# Patient Record
Sex: FEMALE | Race: ASIAN | NOT HISPANIC OR LATINO
[De-identification: names, ages, dates, MRNs, and addresses within clinical notes are randomized per-mention and may not be internally consistent; named-entity substitution may affect disease eponyms.]

---

## 2020-08-22 ENCOUNTER — RESULT REVIEW (OUTPATIENT)
Age: 30
End: 2020-08-22

## 2022-03-01 ENCOUNTER — NON-APPOINTMENT (OUTPATIENT)
Age: 32
End: 2022-03-01

## 2022-03-02 ENCOUNTER — APPOINTMENT (OUTPATIENT)
Dept: DERMATOLOGY | Facility: CLINIC | Age: 32
End: 2022-03-02
Payer: COMMERCIAL

## 2022-03-02 ENCOUNTER — LABORATORY RESULT (OUTPATIENT)
Age: 32
End: 2022-03-02

## 2022-03-02 VITALS — WEIGHT: 200 LBS | HEIGHT: 63 IN | BODY MASS INDEX: 35.44 KG/M2

## 2022-03-02 DIAGNOSIS — D48.5 NEOPLASM OF UNCERTAIN BEHAVIOR OF SKIN: ICD-10-CM

## 2022-03-02 DIAGNOSIS — L50.9 URTICARIA, UNSPECIFIED: ICD-10-CM

## 2022-03-02 DIAGNOSIS — L85.3 XEROSIS CUTIS: ICD-10-CM

## 2022-03-02 PROCEDURE — 11102 TANGNTL BX SKIN SINGLE LES: CPT | Mod: GC

## 2022-03-02 PROCEDURE — 99204 OFFICE O/P NEW MOD 45 MIN: CPT | Mod: GC,25

## 2022-03-10 ENCOUNTER — NON-APPOINTMENT (OUTPATIENT)
Age: 32
End: 2022-03-10

## 2022-08-01 ENCOUNTER — NON-APPOINTMENT (OUTPATIENT)
Age: 32
End: 2022-08-01

## 2022-08-01 ENCOUNTER — APPOINTMENT (OUTPATIENT)
Dept: OPHTHALMOLOGY | Facility: CLINIC | Age: 32
End: 2022-08-01

## 2022-08-01 PROCEDURE — 92015 DETERMINE REFRACTIVE STATE: CPT

## 2022-08-01 PROCEDURE — 92004 COMPRE OPH EXAM NEW PT 1/>: CPT

## 2022-08-05 ENCOUNTER — EMERGENCY (EMERGENCY)
Facility: HOSPITAL | Age: 32
LOS: 1 days | Discharge: HOME | End: 2022-08-05
Attending: EMERGENCY MEDICINE | Admitting: EMERGENCY MEDICINE

## 2022-08-05 VITALS
OXYGEN SATURATION: 99 % | SYSTOLIC BLOOD PRESSURE: 135 MMHG | HEART RATE: 77 BPM | RESPIRATION RATE: 16 BRPM | TEMPERATURE: 97 F | DIASTOLIC BLOOD PRESSURE: 83 MMHG

## 2022-08-05 LAB
ALBUMIN SERPL ELPH-MCNC: 4.6 G/DL — SIGNIFICANT CHANGE UP (ref 3.3–5)
ALP SERPL-CCNC: 80 U/L — SIGNIFICANT CHANGE UP (ref 40–120)
ALT FLD-CCNC: 94 U/L — HIGH (ref 4–33)
ANION GAP SERPL CALC-SCNC: 13 MMOL/L — SIGNIFICANT CHANGE UP (ref 7–14)
APPEARANCE UR: CLEAR — SIGNIFICANT CHANGE UP
AST SERPL-CCNC: 53 U/L — HIGH (ref 4–32)
BASOPHILS # BLD AUTO: 0.08 K/UL — SIGNIFICANT CHANGE UP (ref 0–0.2)
BASOPHILS NFR BLD AUTO: 0.7 % — SIGNIFICANT CHANGE UP (ref 0–2)
BILIRUB SERPL-MCNC: 0.4 MG/DL — SIGNIFICANT CHANGE UP (ref 0.2–1.2)
BILIRUB UR-MCNC: NEGATIVE — SIGNIFICANT CHANGE UP
BUN SERPL-MCNC: 10 MG/DL — SIGNIFICANT CHANGE UP (ref 7–23)
CALCIUM SERPL-MCNC: 9.4 MG/DL — SIGNIFICANT CHANGE UP (ref 8.4–10.5)
CHLORIDE SERPL-SCNC: 97 MMOL/L — LOW (ref 98–107)
CO2 SERPL-SCNC: 24 MMOL/L — SIGNIFICANT CHANGE UP (ref 22–31)
COLOR SPEC: SIGNIFICANT CHANGE UP
CREAT SERPL-MCNC: 0.7 MG/DL — SIGNIFICANT CHANGE UP (ref 0.5–1.3)
DIFF PNL FLD: NEGATIVE — SIGNIFICANT CHANGE UP
EGFR: 119 ML/MIN/1.73M2 — SIGNIFICANT CHANGE UP
EOSINOPHIL # BLD AUTO: 0.29 K/UL — SIGNIFICANT CHANGE UP (ref 0–0.5)
EOSINOPHIL NFR BLD AUTO: 2.7 % — SIGNIFICANT CHANGE UP (ref 0–6)
GLUCOSE SERPL-MCNC: 103 MG/DL — HIGH (ref 70–99)
GLUCOSE UR QL: NEGATIVE — SIGNIFICANT CHANGE UP
HCT VFR BLD CALC: 36.4 % — SIGNIFICANT CHANGE UP (ref 34.5–45)
HGB BLD-MCNC: 12.3 G/DL — SIGNIFICANT CHANGE UP (ref 11.5–15.5)
IANC: 7.52 K/UL — HIGH (ref 1.8–7.4)
IMM GRANULOCYTES NFR BLD AUTO: 0.5 % — SIGNIFICANT CHANGE UP (ref 0–1.5)
KETONES UR-MCNC: NEGATIVE — SIGNIFICANT CHANGE UP
LEUKOCYTE ESTERASE UR-ACNC: NEGATIVE — SIGNIFICANT CHANGE UP
LYMPHOCYTES # BLD AUTO: 2.25 K/UL — SIGNIFICANT CHANGE UP (ref 1–3.3)
LYMPHOCYTES # BLD AUTO: 20.7 % — SIGNIFICANT CHANGE UP (ref 13–44)
MCHC RBC-ENTMCNC: 29.9 PG — SIGNIFICANT CHANGE UP (ref 27–34)
MCHC RBC-ENTMCNC: 33.8 GM/DL — SIGNIFICANT CHANGE UP (ref 32–36)
MCV RBC AUTO: 88.6 FL — SIGNIFICANT CHANGE UP (ref 80–100)
MONOCYTES # BLD AUTO: 0.68 K/UL — SIGNIFICANT CHANGE UP (ref 0–0.9)
MONOCYTES NFR BLD AUTO: 6.3 % — SIGNIFICANT CHANGE UP (ref 2–14)
NEUTROPHILS # BLD AUTO: 7.52 K/UL — HIGH (ref 1.8–7.4)
NEUTROPHILS NFR BLD AUTO: 69.1 % — SIGNIFICANT CHANGE UP (ref 43–77)
NITRITE UR-MCNC: NEGATIVE — SIGNIFICANT CHANGE UP
NRBC # BLD: 0 /100 WBCS — SIGNIFICANT CHANGE UP
NRBC # FLD: 0 K/UL — SIGNIFICANT CHANGE UP
PH UR: 7 — SIGNIFICANT CHANGE UP (ref 5–8)
PLATELET # BLD AUTO: 405 K/UL — HIGH (ref 150–400)
POTASSIUM SERPL-MCNC: 4.1 MMOL/L — SIGNIFICANT CHANGE UP (ref 3.5–5.3)
POTASSIUM SERPL-SCNC: 4.1 MMOL/L — SIGNIFICANT CHANGE UP (ref 3.5–5.3)
PROT SERPL-MCNC: 7.4 G/DL — SIGNIFICANT CHANGE UP (ref 6–8.3)
PROT UR-MCNC: NEGATIVE — SIGNIFICANT CHANGE UP
RBC # BLD: 4.11 M/UL — SIGNIFICANT CHANGE UP (ref 3.8–5.2)
RBC # FLD: 12.9 % — SIGNIFICANT CHANGE UP (ref 10.3–14.5)
SODIUM SERPL-SCNC: 134 MMOL/L — LOW (ref 135–145)
SP GR SPEC: 1.02 — SIGNIFICANT CHANGE UP (ref 1–1.05)
UROBILINOGEN FLD QL: SIGNIFICANT CHANGE UP
WBC # BLD: 10.87 K/UL — HIGH (ref 3.8–10.5)
WBC # FLD AUTO: 10.87 K/UL — HIGH (ref 3.8–10.5)

## 2022-08-05 PROCEDURE — 76830 TRANSVAGINAL US NON-OB: CPT | Mod: 26

## 2022-08-05 PROCEDURE — 99285 EMERGENCY DEPT VISIT HI MDM: CPT

## 2022-08-05 NOTE — ED PROVIDER NOTE - NSICDXPASTMEDICALHX_GEN_ALL_CORE_FT
PAST MEDICAL HISTORY:  Miscarriage of left tubal ectopic pregnancy     PCOS (polycystic ovarian syndrome)      PAST MEDICAL HISTORY:  Miscarriage     PCOS (polycystic ovarian syndrome)

## 2022-08-05 NOTE — ED PROVIDER NOTE - PHYSICAL EXAMINATION
Gen: NAD. A&Ox4. Non-toxic appearing.  HEENT: Normocephalic and atraumatic. PERRL, EOMI, no nasal discharge, mucous membranes moist, no scleral icterus.  CV: Regular rate and rhythm, +S1/S2, no M/R/G. No significant lower extremity edema. Radial and DP pulses present and symmetrical. Capillary refill less than 2 seconds.  Resp: Normal effort and rate. CTAB, no rales, rhonchi, or wheezes.  GI: Abdomen soft, non-distended, non tender to palpation. Left pelvic pain to deep palpation. No masses appreciated.   : Chaperoned by Rekha Hinton ED Tech. Mucus in vaginal canal. No bleeding, OS closed  MSK: No open wounds and no bruising. No CVAT bilaterally.  Neuro: Following commands, speaking in full sentences, moving extremities spontaneously. CN II-XII intact. Strength and sensation symmetric and intact throughout. Gait normal.  Psych: Appropriate mood, cooperative

## 2022-08-05 NOTE — ED PROVIDER NOTE - ATTENDING CONTRIBUTION TO CARE
MD Pak:  patient seen and evaluated with the resident.  I was present for key portions of the History & Physical, and I agree with the Impression & Plan.  MD Pak:  33 yo F, c/o LLQ cramps   Onset: 9pm  Location:  LLQ and L flank  Associated Sx: denies CP/SOB/light-headedness/fatigue, no F/C, no N/V/D, +cramping pelvic pain.  Intensity: no vaginal bleeding  VS: wnl.  Physical Exam: adult F, NAD, NCAT, PERRL, EOMI, neck supple, RRR, CTA B, Abd: s/nd/+mild LLQ TTP  PELVIC EXAM:  nontender adnexa, no bleeding, no odor  Ext: no edema, Neuro: AAOx3, ambulates w/o diff, strength 5/5 & symmetric throughout.  Suspicion for other acute intraabdominal pathology that would necessitate w/o with CT is low at this time.   Impression:  ovarian cysts, >>> torsion  Plan:  cbc, bmp, INR, +/-TVUS.

## 2022-08-05 NOTE — ED ADULT NURSE NOTE - OBJECTIVE STATEMENT
pt A&ox4, came to ED for Left lower back pain that started yesterday. pt states pain started yesterday in her LLQ abdomen and is now in her left lower back.  pt denies Chest pain and SOB. pt denies H/A, Dizziness, lightheadedness, and radiating chest pain. breathing is spontaneous and unlabored. sating 99% on RA. bilateral pedal and radial pulses palpable and strong. right ac 20g IV placed Labs drawn and sent as per ordered. Bed in lowest position, call bell within reach, all other safety and comfort measures provided. awaiting labs results and further orders.

## 2022-08-05 NOTE — ED PROVIDER NOTE - PROGRESS NOTE DETAILS
Patient sitting comfortably. Currently does not endorse pain/ UA negative. Ultrasound shows 3.4cm left hemorrhagic cysts. Will follow up with OB/GYN and dispo with return precautions. Patient sitting comfortably. Currently does not endorse pain. UA negative. Ultrasound shows enlarged left ovary with 3.4cm left hemorrhagic cysts. Will follow up with OB/GYN and dispo with return precautions. Patient sitting comfortably. Endorses mild pain. UA negative. Ultrasound shows enlarged left ovary with 3.4cm left hemorrhagic cysts. Will follow up with OB/GYN and dispo with return precautions.

## 2022-08-05 NOTE — ED PROVIDER NOTE - OBJECTIVE STATEMENT
33 y/o F PMHx PCOS, prior ectopic, presents with one day of Left pelvic pain and back pain. Patient had sudden sharp constant left pelvic pain yesterday, fell asleep and woke up with now left back pain as well. Pain is now intermittent. Has not taken anything for pain. No N/V/D, dysuria, hematuria, numbness/tingling, lightheadedness. LMP 6/11/22. 33 y/o F PMHx PCOS, prior miscarriage, presents with one day of Left pelvic pain and back pain. Patient had sudden sharp constant left pelvic pain yesterday, fell asleep and woke up with now left back pain as well. Pain is now intermittent. Has not taken anything for pain. No N/V/D, dysuria, hematuria, numbness/tingling, lightheadedness. LMP 6/11/22.

## 2022-08-05 NOTE — ED PROVIDER NOTE - PATIENT PORTAL LINK FT
You can access the FollowMyHealth Patient Portal offered by [facility] by registering at the following website: [email or web address]. By joining [employer]’s FollowMyHealth portal, you will also be able to view your health information using other applications (apps) compatible with our system.

## 2022-08-05 NOTE — ED PROVIDER NOTE - NSFOLLOWUPCLINICS_GEN_ALL_ED_FT
[facility]  OB/GYN & Surg  [address] [place] [address]  Phone: [phone]  Fax:

## 2022-08-05 NOTE — ED ADULT TRIAGE NOTE - CHIEF COMPLAINT QUOTE
c/o left lower back pain radiating into left lower quad abdomen since yesterday. Denies any vaginal bleeding or abnormal discharge. LMP 6/11/22, irregular periods. Home pregnancy test was negative. Hx ectopic pregnancy 2017

## 2022-08-05 NOTE — ED PROVIDER NOTE - NS ED ROS FT
GENERAL: No fever or chills  EYES: No change in vision  HEENT: No trouble swallowing or speaking  CARDIAC: No chest pain  PULMONARY: No cough or SOB  GI: No other abdominal pain, no nausea or no vomiting, no diarrhea or constipation  : No changes in urination  SKIN: No rashes  NEURO: No headache, no numbness  MSK: No joint pain  Otherwise as HPI or negative.

## 2022-08-05 NOTE — ED PROVIDER NOTE - CLINICAL SUMMARY MEDICAL DECISION MAKING FREE TEXT BOX
33 y/o F PMHx of PCOS and prior ectopic presents with one day of left pelvic and left lower back pain. Patient sitting comfortably and vital WNL. DDx includes ovarian torsion vs ectopic vs kidney stones. Low suspicion for tubo-ovarian abscess or PID. Will order pregnancy test, labs, transvaginal US, reassess. 33 y/o F PMHx of PCOS and prior miscarriage presents with one day of left pelvic and left lower back pain. Patient sitting comfortably and vital WNL. DDx includes ovarian torsion vs ectopic vs kidney stones. Low suspicion for tubo-ovarian abscess or PID. Will order pregnancy test, labs, transvaginal US, reassess.

## 2022-08-06 ENCOUNTER — TRANSCRIPTION ENCOUNTER (OUTPATIENT)
Age: 32
End: 2022-08-06

## 2022-08-06 ENCOUNTER — INPATIENT (INPATIENT)
Facility: HOSPITAL | Age: 32
LOS: 0 days | Discharge: HOME | End: 2022-08-07
Attending: STUDENT IN AN ORGANIZED HEALTH CARE EDUCATION/TRAINING PROGRAM | Admitting: STUDENT IN AN ORGANIZED HEALTH CARE EDUCATION/TRAINING PROGRAM

## 2022-08-06 VITALS
OXYGEN SATURATION: 98 % | SYSTOLIC BLOOD PRESSURE: 131 MMHG | RESPIRATION RATE: 16 BRPM | TEMPERATURE: 98 F | DIASTOLIC BLOOD PRESSURE: 84 MMHG | HEART RATE: 77 BPM

## 2022-08-06 LAB
APTT BLD: 30.6 SEC — SIGNIFICANT CHANGE UP (ref 27–36.3)
BASOPHILS # BLD AUTO: 0.08 K/UL — SIGNIFICANT CHANGE UP (ref 0–0.2)
BASOPHILS NFR BLD AUTO: 0.6 % — SIGNIFICANT CHANGE UP (ref 0–2)
EOSINOPHIL # BLD AUTO: 0.14 K/UL — SIGNIFICANT CHANGE UP (ref 0–0.5)
EOSINOPHIL NFR BLD AUTO: 1 % — SIGNIFICANT CHANGE UP (ref 0–6)
HCT VFR BLD CALC: 38.4 % — SIGNIFICANT CHANGE UP (ref 34.5–45)
HGB BLD-MCNC: 12.8 G/DL — SIGNIFICANT CHANGE UP (ref 11.5–15.5)
IANC: 12.15 K/UL — HIGH (ref 1.8–7.4)
IMM GRANULOCYTES NFR BLD AUTO: 0.6 % — SIGNIFICANT CHANGE UP (ref 0–1.5)
INR BLD: 0.99 RATIO — SIGNIFICANT CHANGE UP (ref 0.88–1.16)
LYMPHOCYTES # BLD AUTO: 1.36 K/UL — SIGNIFICANT CHANGE UP (ref 1–3.3)
LYMPHOCYTES # BLD AUTO: 9.5 % — LOW (ref 13–44)
MCHC RBC-ENTMCNC: 29.8 PG — SIGNIFICANT CHANGE UP (ref 27–34)
MCHC RBC-ENTMCNC: 33.3 GM/DL — SIGNIFICANT CHANGE UP (ref 32–36)
MCV RBC AUTO: 89.3 FL — SIGNIFICANT CHANGE UP (ref 80–100)
MONOCYTES # BLD AUTO: 0.51 K/UL — SIGNIFICANT CHANGE UP (ref 0–0.9)
MONOCYTES NFR BLD AUTO: 3.6 % — SIGNIFICANT CHANGE UP (ref 2–14)
NEUTROPHILS # BLD AUTO: 12.15 K/UL — HIGH (ref 1.8–7.4)
NEUTROPHILS NFR BLD AUTO: 84.7 % — HIGH (ref 43–77)
NRBC # BLD: 0 /100 WBCS — SIGNIFICANT CHANGE UP
NRBC # FLD: 0 K/UL — SIGNIFICANT CHANGE UP
PLATELET # BLD AUTO: 403 K/UL — HIGH (ref 150–400)
PROTHROM AB SERPL-ACNC: 11.5 SEC — SIGNIFICANT CHANGE UP (ref 10.5–13.4)
RBC # BLD: 4.3 M/UL — SIGNIFICANT CHANGE UP (ref 3.8–5.2)
RBC # FLD: 12.9 % — SIGNIFICANT CHANGE UP (ref 10.3–14.5)
WBC # BLD: 14.32 K/UL — HIGH (ref 3.8–10.5)
WBC # FLD AUTO: 14.32 K/UL — HIGH (ref 3.8–10.5)

## 2022-08-06 PROCEDURE — 99284 EMERGENCY DEPT VISIT MOD MDM: CPT

## 2022-08-06 PROCEDURE — 76830 TRANSVAGINAL US NON-OB: CPT | Mod: 26

## 2022-08-06 RX ORDER — MORPHINE SULFATE 50 MG/1
4 CAPSULE, EXTENDED RELEASE ORAL ONCE
Refills: 0 | Status: DISCONTINUED | OUTPATIENT
Start: 2022-08-06 | End: 2022-08-06

## 2022-08-06 RX ORDER — ONDANSETRON 8 MG/1
4 TABLET, FILM COATED ORAL ONCE
Refills: 0 | Status: COMPLETED | OUTPATIENT
Start: 2022-08-06 | End: 2022-08-06

## 2022-08-06 RX ADMIN — MORPHINE SULFATE 4 MILLIGRAM(S): 50 CAPSULE, EXTENDED RELEASE ORAL at 23:05

## 2022-08-06 RX ADMIN — ONDANSETRON 4 MILLIGRAM(S): 8 TABLET, FILM COATED ORAL at 23:06

## 2022-08-06 NOTE — ED PROVIDER NOTE - PHYSICAL EXAMINATION
VITALS: reviewed  GEN: NAD, A & O x 4  HEAD/EYES: NCAT, EOMI, anicteric sclerae  ENT: mucus membranes moist, oropharynx WNL, trachea midline  RESP: lungs CTA with equal breath sounds bilaterally, chest wall nontender and atraumatic  CV: heart with reg rhythm S1, S2, distal pulses intact and symmetric bilaterally  ABDOMEN: soft, nondistended, nontender, no palpable masses  : no CVAT  MSK: extremities atraumatic and nontender, no edema, no asymmetry.   SKIN: warm, dry, no rash, no bruising, no cyanosis. color appropriate for ethnicity  NEURO: alert, mentating appropriately, no facial asymmetry.   PSYCH: Affect appropriate

## 2022-08-06 NOTE — ED PROVIDER NOTE - PROGRESS NOTE DETAILS
Lizy FARRIS: Received sign out from my colleague Dr. Cohen pt w return visit for pelvic pain w c/f possible torsion, US w c/f larger cyst, on reassessment pt still w pain, will give more meds, offered dx lap per OB, pt agrees, will admit.

## 2022-08-06 NOTE — ED PROVIDER NOTE - OBJECTIVE STATEMENT
31 yo F hx PCOS, prior miscarriage, presenting with worsening L sided pelvic pain now with associated nausea/vomiting. Pain was initially intermittent, now constant. Seen in ED one day ago with noted L sided hemorraghic cyst. Denies vaginal bleeding, urinary sx. No fevers/chills. Took Aleve for pain this morning without relief.      On OCP.

## 2022-08-06 NOTE — ED ADULT TRIAGE NOTE - CHIEF COMPLAINT QUOTE
seen here yesterday-- dx with L ovarian cyst, pain worsening today with n/v, LMP 6/11 (abn periods are normal 2/2 birth control)

## 2022-08-06 NOTE — ED PROVIDER NOTE - CLINICAL SUMMARY MEDICAL DECISION MAKING FREE TEXT BOX
31 yo F presenting with  nv and worsening pelvic pain in setting of ovarian cyst, plan for repeat US r/o torsion, meds, labs, reassess

## 2022-08-06 NOTE — ED ADULT NURSE NOTE - OBJECTIVE STATEMENT
Patient received with the complaints of left lower quadrant pain . As per patient she was seen here yesterday and was discharged with ovarian cyst but pain is getting worse with nausea/vomiting. No other complaints. Specimens collected and sent. Medications given as ordered. Patient tolerated well. No distress noted. Nursing care continues

## 2022-08-07 ENCOUNTER — TRANSCRIPTION ENCOUNTER (OUTPATIENT)
Age: 32
End: 2022-08-07

## 2022-08-07 ENCOUNTER — RESULT REVIEW (OUTPATIENT)
Age: 32
End: 2022-08-07

## 2022-08-07 VITALS
HEART RATE: 74 BPM | RESPIRATION RATE: 20 BRPM | DIASTOLIC BLOOD PRESSURE: 62 MMHG | OXYGEN SATURATION: 97 % | SYSTOLIC BLOOD PRESSURE: 112 MMHG

## 2022-08-07 DIAGNOSIS — N83.209 UNSPECIFIED OVARIAN CYST, UNSPECIFIED SIDE: ICD-10-CM

## 2022-08-07 PROBLEM — O03.9 COMPLETE OR UNSPECIFIED SPONTANEOUS ABORTION WITHOUT COMPLICATION: Chronic | Status: ACTIVE | Noted: 2022-08-05

## 2022-08-07 PROBLEM — E28.2 POLYCYSTIC OVARIAN SYNDROME: Chronic | Status: ACTIVE | Noted: 2022-08-05

## 2022-08-07 LAB
ALBUMIN SERPL ELPH-MCNC: 4.7 G/DL — SIGNIFICANT CHANGE UP (ref 3.3–5)
ALP SERPL-CCNC: 75 U/L — SIGNIFICANT CHANGE UP (ref 40–120)
ALT FLD-CCNC: 108 U/L — HIGH (ref 4–33)
ANION GAP SERPL CALC-SCNC: 14 MMOL/L — SIGNIFICANT CHANGE UP (ref 7–14)
AST SERPL-CCNC: 111 U/L — HIGH (ref 4–32)
B PERT DNA SPEC QL NAA+PROBE: SIGNIFICANT CHANGE UP
B PERT+PARAPERT DNA PNL SPEC NAA+PROBE: SIGNIFICANT CHANGE UP
BILIRUB SERPL-MCNC: 0.4 MG/DL — SIGNIFICANT CHANGE UP (ref 0.2–1.2)
BLD GP AB SCN SERPL QL: NEGATIVE — SIGNIFICANT CHANGE UP
BORDETELLA PARAPERTUSSIS (RAPRVP): SIGNIFICANT CHANGE UP
BUN SERPL-MCNC: 10 MG/DL — SIGNIFICANT CHANGE UP (ref 7–23)
C PNEUM DNA SPEC QL NAA+PROBE: SIGNIFICANT CHANGE UP
CALCIUM SERPL-MCNC: 9.1 MG/DL — SIGNIFICANT CHANGE UP (ref 8.4–10.5)
CHLORIDE SERPL-SCNC: 99 MMOL/L — SIGNIFICANT CHANGE UP (ref 98–107)
CO2 SERPL-SCNC: 20 MMOL/L — LOW (ref 22–31)
CREAT SERPL-MCNC: 0.38 MG/DL — LOW (ref 0.5–1.3)
EGFR: 138 ML/MIN/1.73M2 — SIGNIFICANT CHANGE UP
FLUAV SUBTYP SPEC NAA+PROBE: SIGNIFICANT CHANGE UP
FLUBV RNA SPEC QL NAA+PROBE: SIGNIFICANT CHANGE UP
GLUCOSE SERPL-MCNC: 127 MG/DL — HIGH (ref 70–99)
HADV DNA SPEC QL NAA+PROBE: SIGNIFICANT CHANGE UP
HCOV 229E RNA SPEC QL NAA+PROBE: SIGNIFICANT CHANGE UP
HCOV HKU1 RNA SPEC QL NAA+PROBE: SIGNIFICANT CHANGE UP
HCOV NL63 RNA SPEC QL NAA+PROBE: SIGNIFICANT CHANGE UP
HCOV OC43 RNA SPEC QL NAA+PROBE: SIGNIFICANT CHANGE UP
HMPV RNA SPEC QL NAA+PROBE: SIGNIFICANT CHANGE UP
HPIV1 RNA SPEC QL NAA+PROBE: SIGNIFICANT CHANGE UP
HPIV2 RNA SPEC QL NAA+PROBE: SIGNIFICANT CHANGE UP
HPIV3 RNA SPEC QL NAA+PROBE: SIGNIFICANT CHANGE UP
HPIV4 RNA SPEC QL NAA+PROBE: SIGNIFICANT CHANGE UP
M PNEUMO DNA SPEC QL NAA+PROBE: SIGNIFICANT CHANGE UP
POTASSIUM SERPL-MCNC: SIGNIFICANT CHANGE UP MMOL/L (ref 3.5–5.3)
POTASSIUM SERPL-SCNC: SIGNIFICANT CHANGE UP MMOL/L (ref 3.5–5.3)
PROT SERPL-MCNC: 8.3 G/DL — SIGNIFICANT CHANGE UP (ref 6–8.3)
RAPID RVP RESULT: SIGNIFICANT CHANGE UP
RH IG SCN BLD-IMP: POSITIVE — SIGNIFICANT CHANGE UP
RSV RNA SPEC QL NAA+PROBE: SIGNIFICANT CHANGE UP
RV+EV RNA SPEC QL NAA+PROBE: SIGNIFICANT CHANGE UP
SARS-COV-2 RNA SPEC QL NAA+PROBE: SIGNIFICANT CHANGE UP
SODIUM SERPL-SCNC: 133 MMOL/L — LOW (ref 135–145)

## 2022-08-07 PROCEDURE — 88305 TISSUE EXAM BY PATHOLOGIST: CPT | Mod: 26

## 2022-08-07 PROCEDURE — 58925 REMOVAL OF OVARIAN CYST(S): CPT

## 2022-08-07 DEVICE — VISTASEAL FIBRIN HUMAN 10ML: Type: IMPLANTABLE DEVICE | Status: FUNCTIONAL

## 2022-08-07 RX ORDER — ONDANSETRON 8 MG/1
4 TABLET, FILM COATED ORAL ONCE
Refills: 0 | Status: DISCONTINUED | OUTPATIENT
Start: 2022-08-07 | End: 2022-08-07

## 2022-08-07 RX ORDER — KETOROLAC TROMETHAMINE 30 MG/ML
15 SYRINGE (ML) INJECTION ONCE
Refills: 0 | Status: DISCONTINUED | OUTPATIENT
Start: 2022-08-07 | End: 2022-08-07

## 2022-08-07 RX ORDER — HYDROMORPHONE HYDROCHLORIDE 2 MG/ML
0.5 INJECTION INTRAMUSCULAR; INTRAVENOUS; SUBCUTANEOUS ONCE
Refills: 0 | Status: DISCONTINUED | OUTPATIENT
Start: 2022-08-07 | End: 2022-08-07

## 2022-08-07 RX ORDER — OXYCODONE HYDROCHLORIDE 5 MG/1
1 TABLET ORAL
Qty: 5 | Refills: 0
Start: 2022-08-07

## 2022-08-07 RX ORDER — ACETAMINOPHEN 500 MG
975 TABLET ORAL ONCE
Refills: 0 | Status: COMPLETED | OUTPATIENT
Start: 2022-08-07 | End: 2022-08-07

## 2022-08-07 RX ORDER — OXYCODONE HYDROCHLORIDE 5 MG/1
5 TABLET ORAL ONCE
Refills: 0 | Status: DISCONTINUED | OUTPATIENT
Start: 2022-08-07 | End: 2022-08-07

## 2022-08-07 RX ORDER — SODIUM CHLORIDE 9 MG/ML
1000 INJECTION, SOLUTION INTRAVENOUS
Refills: 0 | Status: DISCONTINUED | OUTPATIENT
Start: 2022-08-07 | End: 2022-08-07

## 2022-08-07 RX ADMIN — SODIUM CHLORIDE 125 MILLILITER(S): 9 INJECTION, SOLUTION INTRAVENOUS at 04:57

## 2022-08-07 RX ADMIN — Medication 15 MILLIGRAM(S): at 03:25

## 2022-08-07 RX ADMIN — Medication 975 MILLIGRAM(S): at 02:45

## 2022-08-07 NOTE — H&P ADULT - ATTENDING COMMENTS
Shortly before surgery, I met with this patient and discussed the planned procedure, including exam under anesthesia by myself and learners (resident/medical student).  Risks of the procedure were reviewed, including but not limited to bleeding, infection, damage to surrounding organs, possibility for unplanned procedure if complications arise.  All questions were answered.    SRIDHAR Torres MD

## 2022-08-07 NOTE — H&P ADULT - ASSESSMENT
33yo  LMP  presents with LLQ pain i/s/o L adnexal ovarian cyst >3.5cm seen on TVUS  now 4.2cm in width and more complex in appearance. While VSS, patient is requiring multiple doses of morphine concerning for right sided torsion:    - Admit to GYN service  - Prepare for OR - send Covid, T&S x2.  - Patient Consented and Added on for OR emergently.  - Discussed the possibility that diagnostic laparoscopy may reveal no torsion or simple untwisting of ovary(ies), or that the laparoscopy may become operative requiring excision of cyst, fallopian tube, ovaries or all three structures - on left or right given laterally on imaging isn't absolute. Patient endorses understanding the various operative possible.    Amyeo Afroz Jereen, PGY-2  Discusses and assessed patient w/ Dr [place] 33yo  LMP  presents with LLQ pain i/s/o L adnexal ovarian cyst >3.5cm seen on TVUS  now 4.2cm in width and more complex in appearance. While VSS, patient is requiring multiple doses of morphine concerning for leftsided torsion:    - Admit to GYN service  - Prepare for OR - send Covid, T&S x2.  - Patient Consented and Added on for OR emergently.  - Discussed the possibility that diagnostic laparoscopy may reveal no torsion or simple untwisting of ovary(ies), or that the laparoscopy may become operative requiring excision of cyst, fallopian tube, ovaries or all three structures - on left or right given laterally on imaging isn't absolute. Patient endorses understanding the various operative possible.    Amyeo Afroz Jereen, PGY-2  Discusses and assessed patient w/ Dr [place] 33yo  LMP  presents with LLQ pain i/s/o L adnexal ovarian cyst >3.5cm seen on TVUS  now 4.2cm in width and more complex in appearance. While VSS, patient is requiring multiple doses of morphine concerning for leftsided torsion:    - Admit to GYN service  - Prepare for OR - send Covid, T&S x2.  - Patient Consented and Added on for OR emergently.  - Disc diagnostic laparoscopy, possible de torsion of ovary, possible ovarian cystectomy, possible ex lap. Risks and benefits disc including bleeding, infection, injury to surrounding organs.   - Discussed the possibility that diagnostic laparoscopy may reveal no torsion or simple untwisting of ovary(ies), or that the laparoscopy may become operative requiring excision of cyst, fallopian tube, ovaries or all three structures - on left or right given laterally on imaging isn't absolute. Patient endorses understanding the various operative possible.    Amyeo Afroz Jereen, PGY-2  Discusses and assessed patient w/ Dr [place]        Agree with above   Basilio FARRIS

## 2022-08-07 NOTE — H&P ADULT - NSHPPHYSICALEXAM_GEN_ALL_CORE
Gen: NAD  Cards: RRR  Pulm: CTAB  Abd: soft, LLQ tenderness, no rebound, guarding  Ext: warm, well perfused Gen: NAD  Cards: RRR  Pulm: CTAB  Abd: soft, + LLQ tenderness, no rebound, guarding  Ext: warm, well perfused

## 2022-08-07 NOTE — PACU DISCHARGE NOTE - THE ANESTHESIA ORDERS USED IN THE PACU ORDER SET WILL BE DISCONTINUED UPON TRANSFER OF THIS PATIENT
----- Message from Koki Townsend sent at 4/14/2020  7:41 AM PDT -----  Regarding: RE: Prescription Question  Contact: [phone]  Thank you, I made an appointment for this Friday, do I need a complete panel for all my meds and not just a TSH test? I'm just wondering as I thought I always had a complete panel.....    Thank you,  Koki PEREA   Statement Selected

## 2022-08-07 NOTE — H&P ADULT - HISTORY OF PRESENT ILLNESS
31yo  LMP  presents for the second time to the ED for management of LLQ pain, for which she presented on Friday and was discharge w/ diagnosis of hemorrhagic cyst. TVUS on  showed a 3.2 L sided hemorrhagic cyst that has now grown to 4.2cm and more complex in appearance on today's TVUS. Patient states her pain had improve upon discharge on , however yesterday afternoon, patient's pain started worsening requiring a 2nd visit to ED. Patient was given morphine 9p w/ improvement of pain to 7/10, currently continued to have pain on movement, and has required Zofran for nausea control 2/2 to her pain. Patient denies chest pain, shortness of breath, fevers, chills, nausea, vomiting, diarrhea.     OBHx: SAB in 2017  GYNHx: denies fibroids, cysts, abnormal paps, STDs  PMH: denies  PSH: denies  Meds: PNVs  All: NKDA  Soc: no substance use, anxiety/depression    accepts blood

## 2022-08-07 NOTE — BRIEF OPERATIVE NOTE - NSICDXBRIEFPROCEDURE_GEN_ALL_CORE_FT
PROCEDURES:  Diagnostic laparoscopy 07-Aug-2022 11:04:42  Klarissa Thompson  Laparoscopic left ovarian cystectomy 07-Aug-2022 11:04:54  Klarissa Thompson  Lysis of adhesions, pelvic 07-Aug-2022 11:05:05  Klarissa Thompson

## 2022-08-07 NOTE — BRIEF OPERATIVE NOTE - OPERATION/FINDINGS
Approximately 4cm right ovarian cyst densely adherent to the right pelvic sidewall, rectum and posterior uterine surface as well as the left fallopian tube. The left fallopian tube was noted to be dilated in appearance with endometriotic appearing implants. The right ovary and fallopian tube were adherent to the right pelvic sidewall and posterior uterine surface. The right fallopian tube was noted to be smaller in caliber than the left, however also dilated in appearance. The right ovary appeared otherwise normal. The uterus was approximately 7cn with smooth serosa, however adherent as mentioned to the bilateral ovaries, fallopian tubes and posteriorly to the rectum. The posterior cul-de-sac was noted to be obliterated by endometriotic adhesions.

## 2022-08-07 NOTE — ASU DISCHARGE PLAN (ADULT/PEDIATRIC) - NS MD DC FALL RISK RISK
For information on Fall & Injury Prevention, visit: [email or web address] OR  [email or web address] OR  https://www.cdc.gov/steadi/patient.html

## 2022-08-07 NOTE — ASU DISCHARGE PLAN (ADULT/PEDIATRIC) - PROVIDER TOKENS
FREE:[LAST:[[facility]],PHONE:[phone]],FAX:[(   )    -],ADDRESS:[[facility]  Oncology [facility], Basement Level  [address] [place] [address]],FOLLOWUP:[2 weeks]]

## 2022-08-07 NOTE — ASU DISCHARGE PLAN (ADULT/PEDIATRIC) - CARE PROVIDER_API CALL
[facility],   [facility]  Oncology Building, Basement Level  [address], [place] [address]  Phone: [phone]  Fax: (   )    -  Follow Up Time: 2 weeks

## 2022-08-08 NOTE — CHART NOTE - NSCHARTNOTEFT_GEN_A_CORE
Evaluated at bedside for post-op check.     Patient feels well. Pain well controlled. Ambulating from stretcher to bathroom without difficulty. Denies fevers, chills, CP, SOB, N/V.     PE  Vital Signs Last 24 Hrs  T(C): 37.2 (07 Aug 2022 09:10), Max: 37.2 (07 Aug 2022 09:10)  T(F): 99 (07 Aug 2022 09:10), Max: 99 (07 Aug 2022 09:10)  HR: 74 (07 Aug 2022 10:15) (66 - 93)  BP: 108/55 (07 Aug 2022 10:15) (96/78 - 131/84)  BP(mean): 68 (07 Aug 2022 10:15) (51 - 82)  RR: 16 (07 Aug 2022 10:15) (14 - 19)  SpO2: 97% (07 Aug 2022 10:15) (95% - 100%)    General: AOx3, NAD   Cardiac: RRR   Pulm: CTAB   Abdomen: Soft, non-tender. normoactive bowel sounds   Incisions: LSC port sites c/d/i. Steri-strips in place   Extremities: Non-tender, nonedematous      33 yo  s/p LSC L. cystectomy + lysis of adhesion. Doing well, meeting recovery milestones.   - discharge home with 2 week follow-up   - pain prescription sent     Anita Hale MD, PGY-2
R4 Chart Note     Patient contacted via phone.   Reports she feels well postoperatively.   She is ambulating without difficulty.   Tolerating regular diet without nausea or emesis.   Has passed flatus and a bowel movement.   Pain is well controlled.      - To follow-up in 2wks for post op visit    - Return precautions reviewed     Soloff PGY4
The patients operative note was dictated with dictation #: [id number]
GYN Service Attending    Called in for this patient for OR for r/o torsion.  Pt consented for laparoscopy, possible detorsion of ovary, ovarian cystectomy, possible salpingoophorectomy and taken to OR.  Diagnostic laparoscopy revealed multiple adhesions c/w endometriosis.  Bowel adherent to the round ligament and left adnexa.  Left ovary adherent to the pelvic sidewall and posterior cul-de-sac.  Bowel adhesions to the left round ligament and left adnexa were taken down.  The left pelvic side wall was opened and the ureter identified.  At this point in the case, Dr. Muñoz took over care of the patient.    SRIDHAR Torres MD

## 2022-08-11 ENCOUNTER — APPOINTMENT (OUTPATIENT)
Dept: OTOLARYNGOLOGY | Facility: CLINIC | Age: 32
End: 2022-08-11

## 2022-08-11 VITALS
DIASTOLIC BLOOD PRESSURE: 84 MMHG | HEART RATE: 82 BPM | WEIGHT: 200 LBS | HEIGHT: 63 IN | SYSTOLIC BLOOD PRESSURE: 121 MMHG | BODY MASS INDEX: 35.44 KG/M2

## 2022-08-11 DIAGNOSIS — Z83.3 FAMILY HISTORY OF DIABETES MELLITUS: ICD-10-CM

## 2022-08-11 DIAGNOSIS — Z87.891 PERSONAL HISTORY OF NICOTINE DEPENDENCE: ICD-10-CM

## 2022-08-11 DIAGNOSIS — Z78.9 OTHER SPECIFIED HEALTH STATUS: ICD-10-CM

## 2022-08-11 LAB — SURGICAL PATHOLOGY STUDY: SIGNIFICANT CHANGE UP

## 2022-08-11 PROCEDURE — 92567 TYMPANOMETRY: CPT

## 2022-08-11 PROCEDURE — 99203 OFFICE O/P NEW LOW 30 MIN: CPT

## 2022-08-11 PROCEDURE — 92557 COMPREHENSIVE HEARING TEST: CPT

## 2022-08-11 RX ORDER — OXYCODONE 5 MG/1
5 TABLET ORAL
Qty: 5 | Refills: 0 | Status: DISCONTINUED | COMMUNITY
Start: 2022-08-07

## 2022-08-11 RX ORDER — KETOCONAZOLE 20.5 MG/ML
2 SHAMPOO, SUSPENSION TOPICAL
Qty: 1 | Refills: 11 | Status: DISCONTINUED | COMMUNITY
Start: 2022-03-02 | End: 2022-08-11

## 2022-08-11 RX ORDER — NORETHINDRONE ACETATE AND ETHINYL ESTRADIOL, AND FERROUS FUMARATE 1MG-20(24)
KIT ORAL
Refills: 0 | Status: ACTIVE | COMMUNITY

## 2022-08-11 RX ORDER — IBUPROFEN 200 MG/1
200 TABLET ORAL
Refills: 0 | Status: ACTIVE | COMMUNITY

## 2022-08-23 NOTE — DATA REVIEWED
Sure that is fine.     Thank you  Jayne Bone DO      [de-identified] : Hearing -1kHz sloping to a mild to moderate SNHL AU\par Type A tymps AU

## 2022-08-23 NOTE — HISTORY OF PRESENT ILLNESS
[de-identified] : 31 yo F with history of hearing loss as teenager - wore hearing aids - stopped wearing at 17 yo. Had intermittent change in hearing AS - lasting a couple of seconds - occurred once a couple of weeks ago. Hearing now back to baseline. Intermittently has high pitched tinnitus bilaterally. No tinnitus, otalgia, otorrhea, ear infections, dizziness or headaches. Slept with headphones with loud music as a child. No history of head trauma and exposure to loud noises. Has history of dry eyes and seasonal allergies. No joint pain or  sister has hx of HL - wore hearing aids - no oME

## 2022-08-29 ENCOUNTER — RESULT REVIEW (OUTPATIENT)
Age: 32
End: 2022-08-29

## 2022-08-29 ENCOUNTER — OUTPATIENT (OUTPATIENT)
Dept: OUTPATIENT SERVICES | Facility: HOSPITAL | Age: 32
LOS: 1 days | End: 2022-08-29

## 2022-08-29 ENCOUNTER — APPOINTMENT (OUTPATIENT)
Dept: OBGYN | Facility: HOSPITAL | Age: 32
End: 2022-08-29

## 2022-08-29 VITALS
WEIGHT: 210 LBS | HEART RATE: 71 BPM | HEIGHT: 60 IN | SYSTOLIC BLOOD PRESSURE: 134 MMHG | DIASTOLIC BLOOD PRESSURE: 68 MMHG | BODY MASS INDEX: 41.23 KG/M2 | TEMPERATURE: 98.1 F

## 2022-08-29 DIAGNOSIS — N89.8 OTHER SPECIFIED NONINFLAMMATORY DISORDERS OF VAGINA: ICD-10-CM

## 2022-08-29 DIAGNOSIS — Z00.00 ENCOUNTER FOR GENERAL ADULT MEDICAL EXAMINATION W/OUT ABNORMAL FINDINGS: ICD-10-CM

## 2022-08-29 DIAGNOSIS — Z09 ENCOUNTER FOR FOLLOW-UP EXAMINATION AFTER COMPLETED TREATMENT FOR CONDITIONS OTHER THAN MALIGNANT NEOPLASM: ICD-10-CM

## 2022-08-29 PROCEDURE — 99024 POSTOP FOLLOW-UP VISIT: CPT

## 2022-08-29 RX ORDER — DESOGESTREL AND ETHINYL ESTRADIOL 0.15-0.03
0.15-3 KIT ORAL
Qty: 3 | Refills: 3 | Status: ACTIVE | COMMUNITY
Start: 2022-08-29 | End: 1900-01-01

## 2022-08-30 LAB
C TRACH RRNA SPEC QL NAA+PROBE: SIGNIFICANT CHANGE UP
CANDIDA AB TITR SER: SIGNIFICANT CHANGE UP
G VAGINALIS DNA SPEC QL NAA+PROBE: SIGNIFICANT CHANGE UP
N GONORRHOEA RRNA SPEC QL NAA+PROBE: SIGNIFICANT CHANGE UP
SPECIMEN SOURCE: SIGNIFICANT CHANGE UP
T VAGINALIS SPEC QL WET PREP: SIGNIFICANT CHANGE UP

## 2022-09-01 DIAGNOSIS — N89.8 OTHER SPECIFIED NONINFLAMMATORY DISORDERS OF VAGINA: ICD-10-CM

## 2022-09-01 DIAGNOSIS — Z09 ENCOUNTER FOR FOLLOW-UP EXAMINATION AFTER COMPLETED TREATMENT FOR CONDITIONS OTHER THAN MALIGNANT NEOPLASM: ICD-10-CM

## 2022-09-02 NOTE — ED PROVIDER NOTE - NS ED MD DISPO DISCHARGE
,DirectAddress_Unknown,[email or web address] Home

## 2022-09-07 ENCOUNTER — APPOINTMENT (OUTPATIENT)
Dept: DERMATOLOGY | Facility: CLINIC | Age: 32
End: 2022-09-07

## 2022-09-07 DIAGNOSIS — L21.9 SEBORRHEIC DERMATITIS, UNSPECIFIED: ICD-10-CM

## 2022-09-07 DIAGNOSIS — Z12.83 ENCOUNTER FOR SCREENING FOR MALIGNANT NEOPLASM OF SKIN: ICD-10-CM

## 2022-09-07 DIAGNOSIS — D22.9 MELANOCYTIC NEVI, UNSPECIFIED: ICD-10-CM

## 2022-09-07 DIAGNOSIS — L70.0 ACNE VULGARIS: ICD-10-CM

## 2022-09-07 PROCEDURE — 99214 OFFICE O/P EST MOD 30 MIN: CPT | Mod: GC

## 2022-09-07 RX ORDER — MOMETASONE FUROATE 1 MG/ML
0.1 SOLUTION TOPICAL
Qty: 1 | Refills: 3 | Status: ACTIVE | COMMUNITY
Start: 2022-03-02 | End: 1900-01-01

## 2022-09-07 RX ORDER — CICLOPIROX 10 MG/.96ML
1 SHAMPOO TOPICAL
Qty: 1 | Refills: 5 | Status: ACTIVE | COMMUNITY
Start: 2022-09-07 | End: 1900-01-01

## 2022-09-08 RX ORDER — TRETINOIN 0.25 MG/G
0.03 CREAM TOPICAL
Qty: 1 | Refills: 11 | Status: ACTIVE | COMMUNITY
Start: 2022-09-07

## 2022-09-16 ENCOUNTER — APPOINTMENT (OUTPATIENT)
Dept: PHARMACY | Facility: CLINIC | Age: 32
End: 2022-09-16

## 2022-09-16 PROCEDURE — V5010 ASSESSMENT FOR HEARING AID: CPT

## 2022-09-21 NOTE — PLAN
[FreeTextEntry1] : 33yo  s/p L ovarian cystectomy, GISELLE doing well postoperatively\par \par #Post Op\par - Pt doing well postoperatively\par - resume normal activities\par \par #Endometriosis\par - Switched from Lo Loestrin to Apri OCP based on patient preference\par \par #Vaginal Odor\par - f/u BV panel\par - f/u GC/Chlamydia Amplification\par \par Margaret Cody, PGY1\par d/w Dr. Kim

## 2022-09-21 NOTE — HISTORY OF PRESENT ILLNESS
[Pain is well-controlled] : pain is well-controlled [Vaginal Discharge] : vaginal discharge [Clean/Dry/Intact] : clean, dry and intact [Healed] : healed [None] : no vaginal bleeding [Normal] : normal [Pathology reviewed] : pathology reviewed [Fever] : no fever [Chills] : no chills [Nausea] : no nausea [Vomiting] : no vomiting [Diarrhea] : no diarrhea [Vaginal Bleeding] : no vaginal bleeding [Pelvic Pressure] : no pelvic pressure [Dysuria] : no dysuria [Constipation] : no constipation [Erythema] : not erythematous [Swelling] : not swollen [Dehiscence] : not dehisced [Discharge] : absent of discharge [de-identified] : 24 [de-identified] : L ovarian cystectomy, GISELLE [de-identified] : 31yo  s/p L ovarian cystectomy, GISELLE presenting for post op check. She complains of some LLQ crampy pain that is improving. She also complains of a vaginal odor that started the day before surgery, went away after surgery but has now returned. She denies any vaginal pain, itchiness, or vaginal bleeding. \par \par OB: 2017\par GYN: PCOS, endometriosis, -f/STI, history of abnormal pap s/p colposcopy (unsure of results)\par PMH: none\par PSH: L ovarian cystectomy, GISELLE\par Meds: OCP (Lo Loestrin)\par All: NKDA, shellfish, adhesives\par FH: mother, father - HTN, DM; maternal grandmother - breast ca\par SH: tobacco from age 16, 3-5 cigarettes per day, quit a year ago; social ETOH, no drugs [de-identified] : Gen: well appearing, NAD   Abd: soft, nontender to palpation   Pelvic: thin, off-white discharge, no odor noted

## 2022-10-14 ENCOUNTER — APPOINTMENT (OUTPATIENT)
Dept: PHARMACY | Facility: CLINIC | Age: 32
End: 2022-10-14

## 2022-10-14 PROCEDURE — V5261C: CUSTOM | Mod: RT,LT

## 2022-11-03 NOTE — ED ADULT NURSE NOTE - NS ED NURSE LEVEL OF CONSCIOUSNESS ORIENTATION
Caller returning call to clinical team.     Connected to [facility]- [place]- Connect call to [place] CMA queue- Route message to provider's clinical support pool   Oriented - self; Oriented - place; Oriented - time

## 2022-12-15 ENCOUNTER — APPOINTMENT (OUTPATIENT)
Dept: PHARMACY | Facility: CLINIC | Age: 32
End: 2022-12-15

## 2022-12-15 PROCEDURE — V5299A: CUSTOM | Mod: NC,RT,LT

## 2023-08-23 ENCOUNTER — APPOINTMENT (OUTPATIENT)
Dept: OTOLARYNGOLOGY | Facility: CLINIC | Age: 33
End: 2023-08-23
Payer: COMMERCIAL

## 2023-08-23 VITALS
DIASTOLIC BLOOD PRESSURE: 73 MMHG | HEART RATE: 85 BPM | SYSTOLIC BLOOD PRESSURE: 107 MMHG | BODY MASS INDEX: 37.21 KG/M2 | HEIGHT: 63 IN | WEIGHT: 210 LBS

## 2023-08-23 DIAGNOSIS — H90.5 UNSPECIFIED SENSORINEURAL HEARING LOSS: ICD-10-CM

## 2023-08-23 PROCEDURE — 92557 COMPREHENSIVE HEARING TEST: CPT

## 2023-08-23 PROCEDURE — 92567 TYMPANOMETRY: CPT

## 2023-08-23 PROCEDURE — 99213 OFFICE O/P EST LOW 20 MIN: CPT

## 2023-08-23 NOTE — HISTORY OF PRESENT ILLNESS
[de-identified] : 33 year old female with symmetric SNHL. Presents for annual evaluation. Wearing bilateral hearing aids daily - consistent all day use. Reports left muffled hearing for the past 2 weeks. Muffled hearing in the left ear occurs intermittently for the past year. Occasional high-pitched tinnitus bilaterally. Denies aural fullness, otalgia, drainage and dizziness.

## 2023-08-23 NOTE — DATA REVIEWED
[de-identified] : AU: Hearing WNL sloping to a moderate SNHL .25-8kHz w/ excellent WRS and type A tymps.

## 2023-09-06 ENCOUNTER — NON-APPOINTMENT (OUTPATIENT)
Age: 33
End: 2023-09-06

## 2023-09-12 ENCOUNTER — TRANSCRIPTION ENCOUNTER (OUTPATIENT)
Age: 33
End: 2023-09-12

## 2024-09-23 ENCOUNTER — APPOINTMENT (OUTPATIENT)
Dept: DERMATOLOGY | Facility: CLINIC | Age: 34
End: 2024-09-23
Payer: COMMERCIAL

## 2024-09-23 DIAGNOSIS — L70.0 ACNE VULGARIS: ICD-10-CM

## 2024-09-23 DIAGNOSIS — L29.9 PRURITUS, UNSPECIFIED: ICD-10-CM

## 2024-09-23 DIAGNOSIS — Z12.83 ENCOUNTER FOR SCREENING FOR MALIGNANT NEOPLASM OF SKIN: ICD-10-CM

## 2024-09-23 DIAGNOSIS — L21.9 SEBORRHEIC DERMATITIS, UNSPECIFIED: ICD-10-CM

## 2024-09-23 PROCEDURE — 99214 OFFICE O/P EST MOD 30 MIN: CPT

## 2024-09-23 RX ORDER — CICLOPIROX 10 MG/.96ML
1 SHAMPOO TOPICAL
Qty: 1 | Refills: 2 | Status: ACTIVE | COMMUNITY
Start: 2024-09-23 | End: 1900-01-01

## 2024-09-23 RX ORDER — SPIRONOLACTONE 50 MG/1
50 TABLET ORAL
Qty: 1 | Refills: 2 | Status: ACTIVE | COMMUNITY
Start: 2024-09-23 | End: 1900-01-01

## 2024-09-28 NOTE — PHYSICAL EXAM
[Alert] : alert [Oriented x 3] : ~L oriented x 3 [Well Nourished] : well nourished [Conjunctiva Non-injected] : conjunctiva non-injected [No Visual Lymphadenopathy] : no visual  lymphadenopathy [No Clubbing] : no clubbing [No Edema] : no edema [No Bromhidrosis] : no bromhidrosis [No Chromhidrosis] : no chromhidrosis [Full Body Skin Exam Performed] : performed [FreeTextEntry3] : - Patient denied genital examination.  - inflammatory papules on the cheeks and chin - moderate xerosis - negative for dermatographism - scale in scalp

## 2024-09-28 NOTE — HISTORY OF PRESENT ILLNESS
[FreeTextEntry1] : F/u acne, itch [de-identified] : 34 year old woman here for eval of   1. acne x years, mostly jawline and lower cheeks - has PCOS, flares with periods, which occur regularly, is on birth control, not planning on getting pregnant - has taken spironolactone in the past for mo, got thirsty, maybe had low sodium? - using boyfriend's tret 0.05% which irritates her skin 2. itchiness - used honest body wash with fragrance which made her itchiness worse - takes two Claritin nightly 3. dry itchy scalp - keto shampoo made dryness worse, does not want topical steroid solution even though mometasone did help previously  Requests FBE.

## 2024-09-28 NOTE — ASSESSMENT
[FreeTextEntry1] : #Acne vulgaris, hormonal, mild exacerbation of chronic disease  - Discussed nature, chronicity and unpredictable course - Reviewed risks (as well as mitigation strategies for adverse drug events as applicable), benefits, and alternatives of therapy  - START tretinoin 0.025% with small pea-sized amount to entire face, start slow with once to twice a week and slowly increase as tolerated to nightly. Use moisturizer and sunscreen. - Start spironolactone 50 mg once daily, SED. increase to 100 mg daily if tolerating after 1 week. Must stop if any possibility pt may be pregnant or if there is a plan to become pregnant   # Pruritus - gentle skincare reviewed - patient prefers to avoid topical steroids - Discussed liberal moisturizer use    # Seborrheic dermatitis - mild exacerbation of chronic disease  - Reviewed risks (as well as mitigation strategies for adverse drug events as applicable), benefits, and alternatives of therapy  - diagnosis reviewed - START ciclopirox shampoo apply to scalp 3x/week and alternate with regular shampoo  # Skin cancer Screening - No lesions clinically concerning for malignancy today - Discussed regular self skin checks and ABCDEs of skin cancer screening - Discussed regular sunscreen use - Pt instructed to report any new or changing lesions  RTC 1 yr for FBSE or sooner if any concerns  RTC 3 months for acne

## 2024-09-28 NOTE — HISTORY OF PRESENT ILLNESS
[FreeTextEntry1] : F/u acne, itch [de-identified] : 34 year old woman here for eval of   1. acne x years, mostly jawline and lower cheeks - has PCOS, flares with periods, which occur regularly, is on birth control, not planning on getting pregnant - has taken spironolactone in the past for mo, got thirsty, maybe had low sodium? - using boyfriend's tret 0.05% which irritates her skin 2. itchiness - used honest body wash with fragrance which made her itchiness worse - takes two Claritin nightly 3. dry itchy scalp - keto shampoo made dryness worse, does not want topical steroid solution even though mometasone did help previously  Requests FBE.

## 2024-11-20 ENCOUNTER — NON-APPOINTMENT (OUTPATIENT)
Age: 34
End: 2024-11-20

## 2024-11-20 ENCOUNTER — APPOINTMENT (OUTPATIENT)
Dept: INTERNAL MEDICINE | Facility: CLINIC | Age: 34
End: 2024-11-20

## 2024-11-20 ENCOUNTER — OUTPATIENT (OUTPATIENT)
Dept: OUTPATIENT SERVICES | Facility: HOSPITAL | Age: 34
LOS: 1 days | End: 2024-11-20

## 2024-11-20 ENCOUNTER — TRANSCRIPTION ENCOUNTER (OUTPATIENT)
Age: 34
End: 2024-11-20

## 2024-12-02 ENCOUNTER — APPOINTMENT (OUTPATIENT)
Dept: OPHTHALMOLOGY | Facility: CLINIC | Age: 34
End: 2024-12-02
Payer: COMMERCIAL

## 2024-12-02 ENCOUNTER — NON-APPOINTMENT (OUTPATIENT)
Age: 34
End: 2024-12-02

## 2024-12-02 PROCEDURE — 92004 COMPRE OPH EXAM NEW PT 1/>: CPT

## 2024-12-02 PROCEDURE — 92015 DETERMINE REFRACTIVE STATE: CPT

## 2024-12-10 ENCOUNTER — TRANSCRIPTION ENCOUNTER (OUTPATIENT)
Age: 34
End: 2024-12-10

## 2024-12-12 ENCOUNTER — APPOINTMENT (OUTPATIENT)
Dept: DERMATOLOGY | Facility: CLINIC | Age: 34
End: 2024-12-12
Payer: COMMERCIAL

## 2024-12-12 ENCOUNTER — NON-APPOINTMENT (OUTPATIENT)
Age: 34
End: 2024-12-12

## 2024-12-12 VITALS — BODY MASS INDEX: 38.64 KG/M2 | HEIGHT: 62 IN | WEIGHT: 210 LBS

## 2024-12-12 DIAGNOSIS — L21.9 SEBORRHEIC DERMATITIS, UNSPECIFIED: ICD-10-CM

## 2024-12-12 DIAGNOSIS — L70.0 ACNE VULGARIS: ICD-10-CM

## 2024-12-12 DIAGNOSIS — L50.3 DERMATOGRAPHIC URTICARIA: ICD-10-CM

## 2024-12-12 PROCEDURE — 99214 OFFICE O/P EST MOD 30 MIN: CPT

## 2024-12-13 PROBLEM — L50.3 DERMATOGRAPHISM: Status: ACTIVE | Noted: 2024-12-13

## 2024-12-18 ENCOUNTER — APPOINTMENT (OUTPATIENT)
Dept: OTOLARYNGOLOGY | Facility: CLINIC | Age: 34
End: 2024-12-18
Payer: COMMERCIAL

## 2024-12-18 VITALS
DIASTOLIC BLOOD PRESSURE: 83 MMHG | HEIGHT: 62 IN | WEIGHT: 210 LBS | SYSTOLIC BLOOD PRESSURE: 119 MMHG | BODY MASS INDEX: 38.64 KG/M2 | HEART RATE: 89 BPM

## 2024-12-18 DIAGNOSIS — H90.5 UNSPECIFIED SENSORINEURAL HEARING LOSS: ICD-10-CM

## 2024-12-18 PROCEDURE — 92557 COMPREHENSIVE HEARING TEST: CPT

## 2024-12-18 PROCEDURE — 99213 OFFICE O/P EST LOW 20 MIN: CPT

## 2024-12-18 PROCEDURE — 92567 TYMPANOMETRY: CPT

## 2025-01-07 ENCOUNTER — TRANSCRIPTION ENCOUNTER (OUTPATIENT)
Age: 35
End: 2025-01-07

## (undated) DEVICE — TROCAR COVIDIEN BLUNT TIP HASSAN 12MM STANDARD

## (undated) DEVICE — SYR LUER LOK 10CC

## (undated) DEVICE — D HELP - CLEARVIEW CLEARIFY SYSTEM

## (undated) DEVICE — TIP METZENBAUM SCISSOR MONOPOLAR ENDOCUT (ORANGE)

## (undated) DEVICE — PACK D&C

## (undated) DEVICE — TROCAR COVIDIEN VERSAPORT BLADELESS OPTICAL 5MM STANDARD

## (undated) DEVICE — TROCAR COVIDIEN VERSAONE FIXATION CANNULA 5MM

## (undated) DEVICE — SUT MONOCRYL 4-0 27" PS-2 UNDYED

## (undated) DEVICE — SOL IRR BAG NS 0.9% 3000ML

## (undated) DEVICE — TUBING HYDRO-SURG PLUS IRRIGATOR W SMOKEVAC & PROBE

## (undated) DEVICE — INSUFFLATION NDL COVIDIEN SURGINEEDLE VERESS 120MM

## (undated) DEVICE — PACK PERI GYN

## (undated) DEVICE — SCOPE WARMER SEAL DISP

## (undated) DEVICE — TROCAR COVIDIEN VERSAONE BLADELESS FIXATION 5MM STANDARD

## (undated) DEVICE — TUBING OLYMPUS INSUFFLATION

## (undated) DEVICE — TRAP SPECIMEN SPUTUM 40CC

## (undated) DEVICE — POSITIONER STRAP ARMBOARD VELCRO TS-30

## (undated) DEVICE — BASIN SET SINGLE

## (undated) DEVICE — LIGASURE BLUNT TIP 37CM

## (undated) DEVICE — Device

## (undated) DEVICE — SUT VICRYL 0 27" UR-6

## (undated) DEVICE — SHEARS HARMONIC HD 1000I 5MM X 36CM CURVED TIP

## (undated) DEVICE — WARMING BLANKET FULL ADULT

## (undated) DEVICE — DRSG TELFA 3 X 8

## (undated) DEVICE — VENODYNE/SCD SLEEVE CALF MEDIUM

## (undated) DEVICE — APPLICATOR VISTASEAL LAP DUAL 45CM FLEX

## (undated) DEVICE — DRSG TEGADERM 2.5X3"

## (undated) DEVICE — GOWN LG

## (undated) DEVICE — PACK GENERAL LAPAROSCOPY

## (undated) DEVICE — TUBING INSUFFLATION LAP FILTER 10FT

## (undated) DEVICE — BLADE SURGICAL #15 CARBON

## (undated) DEVICE — FOLEY TRAY 16FR 5CC LF UMETER CLOSED

## (undated) DEVICE — POSITIONER PURPLE ARM ONE STEP (LARGE)

## (undated) DEVICE — POSITIONER PINK PAD PIGAZZI SYSTEM

## (undated) DEVICE — ENDOCATCH 10MM SPECIMEN POUCH